# Patient Record
Sex: MALE | Race: WHITE | NOT HISPANIC OR LATINO
[De-identification: names, ages, dates, MRNs, and addresses within clinical notes are randomized per-mention and may not be internally consistent; named-entity substitution may affect disease eponyms.]

---

## 2017-12-20 PROBLEM — 14760008 CONSTIPATION: Status: ACTIVE | Noted: 2017-12-20

## 2024-07-26 ENCOUNTER — DASHBOARD ENCOUNTERS (OUTPATIENT)
Age: 59
End: 2024-07-26

## 2024-08-06 ENCOUNTER — OFFICE VISIT (OUTPATIENT)
Dept: URBAN - METROPOLITAN AREA CLINIC 35 | Facility: CLINIC | Age: 59
End: 2024-08-06
Payer: SELF-PAY

## 2024-08-06 VITALS
OXYGEN SATURATION: 95 % | SYSTOLIC BLOOD PRESSURE: 128 MMHG | HEIGHT: 74 IN | HEART RATE: 80 BPM | WEIGHT: 225 LBS | BODY MASS INDEX: 28.88 KG/M2 | DIASTOLIC BLOOD PRESSURE: 84 MMHG

## 2024-08-06 DIAGNOSIS — R13.19 CERVICAL DYSPHAGIA: ICD-10-CM

## 2024-08-06 PROCEDURE — 99203 OFFICE O/P NEW LOW 30 MIN: CPT | Performed by: INTERNAL MEDICINE

## 2024-08-06 PROCEDURE — 99243 OFF/OP CNSLTJ NEW/EST LOW 30: CPT | Performed by: INTERNAL MEDICINE

## 2024-08-06 RX ORDER — ZOLPIDEM TARTRATE 10 MG/1
1 TABLET AT BEDTIME AS NEEDED TABLET, FILM COATED ORAL ONCE A DAY
Status: ACTIVE | COMMUNITY

## 2024-08-06 RX ORDER — ALUMINUM ZIRCONIUM TRICHLOROHYDREX GLY 0.19 G/G
1 TABLET 30 MINUTES BEFORE MORNING MEAL STICK TOPICAL ONCE A DAY
Qty: 30 | OUTPATIENT
Start: 2024-08-06

## 2024-08-06 RX ORDER — TRIAMCINOLONE ACETONIDE 55 UG/1
1 PUFF IN EACH NOSTRIL SPRAY, METERED NASAL ONCE A DAY
Status: ACTIVE | COMMUNITY

## 2024-08-06 RX ORDER — MAGNESIUM OXIDE/MAG AA CHELATE 300 MG
1 CAPSULE WITH A MEAL CAPSULE ORAL ONCE A DAY
Status: ACTIVE | COMMUNITY

## 2024-08-06 NOTE — HPI-DYSPHAGIA
58 years old male patient present today for a consultation of dysphagia, ongoing x a couple of months, most days of week . This is not the first time that he has had this issue and states that he might need an EGD with dilation. He points to his supraclavicular notch when describing where the food feels stuck, although he is able to chew his food and transfer it into his posterior pharynx without difficulty. Patient admits his symptoms can be moderate-to-severe. Drinking water will usually relieve his symptoms, but patient states that sometimes he will have to sit and try and relax in order for the food to go down. Patient denies forced regurgitation. Patient states his last EGD was completed on 10.23.2015, but denies having a Barium Swallow completed. He denies any associated symptoms of nausea, excessive belching, globus, sour eructations, bloating/gas, early satiety, or heartburn. Of note, patient's mother passed away at age 78 from bile duct cancer.   HE has burning in the lower esophagus few times a week due to certain foods  Most recent labs completed on 07.30.2024 showed MCV: 98H, MCHC: 31.4L, BUN/Creatinine Ratio: 23H, Chloride: 107H, Triglycerides: 163H, HDL-Cholesterol: 35L, all other labs were normal.

## 2024-08-16 ENCOUNTER — OFFICE VISIT (OUTPATIENT)
Dept: URBAN - METROPOLITAN AREA SURGERY CENTER 8 | Facility: SURGERY CENTER | Age: 59
End: 2024-08-16

## 2024-08-28 ENCOUNTER — OFFICE VISIT (OUTPATIENT)
Dept: URBAN - METROPOLITAN AREA SURGERY CENTER 8 | Facility: SURGERY CENTER | Age: 59
End: 2024-08-28

## 2024-09-06 ENCOUNTER — OFFICE VISIT (OUTPATIENT)
Dept: URBAN - METROPOLITAN AREA SURGERY CENTER 8 | Facility: SURGERY CENTER | Age: 59
End: 2024-09-06

## 2024-09-10 ENCOUNTER — OFFICE VISIT (OUTPATIENT)
Dept: URBAN - METROPOLITAN AREA CLINIC 31 | Facility: CLINIC | Age: 59
End: 2024-09-10

## 2024-09-24 ENCOUNTER — OFFICE VISIT (OUTPATIENT)
Dept: URBAN - METROPOLITAN AREA CLINIC 31 | Facility: CLINIC | Age: 59
End: 2024-09-24

## 2024-10-09 ENCOUNTER — CLAIMS CREATED FROM THE CLAIM WINDOW (OUTPATIENT)
Dept: URBAN - METROPOLITAN AREA SURGERY CENTER 8 | Facility: SURGERY CENTER | Age: 59
End: 2024-10-09
Payer: COMMERCIAL

## 2024-10-09 ENCOUNTER — CLAIMS CREATED FROM THE CLAIM WINDOW (OUTPATIENT)
Dept: URBAN - METROPOLITAN AREA CLINIC 4 | Facility: CLINIC | Age: 59
End: 2024-10-09
Payer: COMMERCIAL

## 2024-10-09 DIAGNOSIS — K21.9 GASTRO - ESOPHAGEAL REFLUX DISEASE: ICD-10-CM

## 2024-10-09 DIAGNOSIS — K22.89 OTHER SPECIFIED DISEASE OF ESOPHAGUS: ICD-10-CM

## 2024-10-09 DIAGNOSIS — K31.89 OTHER DISEASES OF STOMACH AND DUODENUM: ICD-10-CM

## 2024-10-09 DIAGNOSIS — K21.9 ACID REFLUX: ICD-10-CM

## 2024-10-09 DIAGNOSIS — K21.9 GASTRO-ESOPHAGEAL REFLUX DISEASE WITHOUT ESOPHAGITIS: ICD-10-CM

## 2024-10-09 DIAGNOSIS — R13.19 OTHER DYSPHAGIA: ICD-10-CM

## 2024-10-09 DIAGNOSIS — K22.2 ESOPHAGEAL OBSTRUCTION: ICD-10-CM

## 2024-10-09 PROBLEM — 266433003: Status: ACTIVE | Noted: 2024-10-09

## 2024-10-09 PROCEDURE — 88312 SPECIAL STAINS GROUP 1: CPT | Performed by: PATHOLOGY

## 2024-10-09 PROCEDURE — 43248 EGD GUIDE WIRE INSERTION: CPT | Performed by: INTERNAL MEDICINE

## 2024-10-09 PROCEDURE — 88305 TISSUE EXAM BY PATHOLOGIST: CPT | Performed by: PATHOLOGY

## 2024-10-09 PROCEDURE — 00731 ANES UPR GI NDSC PX NOS: CPT | Performed by: NURSE ANESTHETIST, CERTIFIED REGISTERED

## 2024-10-09 PROCEDURE — 43239 EGD BIOPSY SINGLE/MULTIPLE: CPT | Performed by: INTERNAL MEDICINE

## 2024-10-09 RX ORDER — TRIAMCINOLONE ACETONIDE 55 UG/1
1 PUFF IN EACH NOSTRIL SPRAY, METERED NASAL ONCE A DAY
Status: ACTIVE | COMMUNITY

## 2024-10-09 RX ORDER — MAGNESIUM OXIDE/MAG AA CHELATE 300 MG
1 CAPSULE WITH A MEAL CAPSULE ORAL ONCE A DAY
Status: ACTIVE | COMMUNITY

## 2024-10-09 RX ORDER — ALUMINUM ZIRCONIUM TRICHLOROHYDREX GLY 0.19 G/G
1 TABLET 30 MINUTES BEFORE MORNING MEAL STICK TOPICAL ONCE A DAY
Qty: 30 | Status: ACTIVE | COMMUNITY
Start: 2024-08-06

## 2024-10-09 RX ORDER — ZOLPIDEM TARTRATE 10 MG/1
1 TABLET AT BEDTIME AS NEEDED TABLET, FILM COATED ORAL ONCE A DAY
Status: ACTIVE | COMMUNITY

## 2024-10-09 RX ORDER — OMEPRAZOLE 40 MG/1
1 CAPSULE 30 MINUTES BEFORE MEAL CAPSULE, DELAYED RELEASE ORAL TWICE DAILY
Qty: 180 | Refills: 3 | OUTPATIENT
Start: 2024-10-09

## 2024-11-12 ENCOUNTER — OFFICE VISIT (OUTPATIENT)
Dept: URBAN - METROPOLITAN AREA CLINIC 31 | Facility: CLINIC | Age: 59
End: 2024-11-12

## 2024-11-12 RX ORDER — OMEPRAZOLE 40 MG/1
1 CAPSULE 30 MINUTES BEFORE MEAL CAPSULE, DELAYED RELEASE ORAL TWICE DAILY
Qty: 180 | Refills: 3 | Status: ACTIVE | COMMUNITY
Start: 2024-10-09

## 2024-11-12 RX ORDER — ZOLPIDEM TARTRATE 10 MG/1
1 TABLET AT BEDTIME AS NEEDED TABLET, FILM COATED ORAL ONCE A DAY
Status: ACTIVE | COMMUNITY

## 2024-11-12 RX ORDER — TRIAMCINOLONE ACETONIDE 55 UG/1
1 PUFF IN EACH NOSTRIL SPRAY, METERED NASAL ONCE A DAY
Status: ACTIVE | COMMUNITY

## 2024-11-12 RX ORDER — ALUMINUM ZIRCONIUM TRICHLOROHYDREX GLY 0.19 G/G
1 TABLET 30 MINUTES BEFORE MORNING MEAL STICK TOPICAL ONCE A DAY
Qty: 30 | Status: ACTIVE | COMMUNITY
Start: 2024-08-06

## 2024-11-12 RX ORDER — MAGNESIUM OXIDE/MAG AA CHELATE 300 MG
1 CAPSULE WITH A MEAL CAPSULE ORAL ONCE A DAY
Status: ACTIVE | COMMUNITY

## 2024-11-12 NOTE — HPI-ENDOSCOPY (EGD) FOLLOWUP
Patient presents today for follow up to his EGD.  Since the procedure patient 10/09/2024 dysphagia, Globus, changes in appetite, and changes in bowel habits.

## 2024-11-12 NOTE — HPI-DYSPHAGIA
Patient present today for follow up of dysphagia.  Patient admits/denies his symptoms can be moderate-to-severe.  He admits/ denies drinking water will usually relieve his symptoms, but patient admits/ denies that sometimes he will have to sit and try and relax in order for the food to go down. Patient admits/denies forced regurgitation.  He admits/denies any associated symptoms of nausea, excessive belching, globus, sour eructations, bloating/gas, early satiety, or heartburn.   (Last Visit 08/06/2024) 58 years old male patient present today for a consultation of dysphagia, ongoing x a couple of months, most days of week . This is not the first time that he has had this issue and states that he might need an EGD with dilation. He points to his supraclavicular notch when describing where the food feels stuck, although he is able to chew his food and transfer it into his posterior pharynx without difficulty. Patient admits his symptoms can be moderate-to-severe. Drinking water will usually relieve his symptoms, but patient states that sometimes he will have to sit and try and relax in order for the food to go down. Patient denies forced regurgitation. Patient states his last EGD was completed on 10.23.2015, but denies having a Barium Swallow completed. He denies any associated symptoms of nausea, excessive belching, globus, sour eructations, bloating/gas, early satiety, or heartburn. Of note, patient's mother passed away at age 78 from bile duct cancer.   HE has burning in the lower esophagus few times a week due to certain foods  Most recent labs completed on 07.30.2024 showed MCV: 98H, MCHC: 31.4L, BUN/Creatinine Ratio: 23H, Chloride: 107H, Triglycerides: 163H, HDL-Cholesterol: 35L, all other labs were normal.